# Patient Record
Sex: FEMALE | Race: OTHER
[De-identification: names, ages, dates, MRNs, and addresses within clinical notes are randomized per-mention and may not be internally consistent; named-entity substitution may affect disease eponyms.]

---

## 2018-12-09 ENCOUNTER — HOSPITAL ENCOUNTER (INPATIENT)
Dept: HOSPITAL 72 - EMR | Age: 78
LOS: 2 days | Discharge: NURSING, REHAB OR LONG-TERM CARE | DRG: 395 | End: 2018-12-11
Payer: MEDICARE

## 2018-12-09 VITALS — SYSTOLIC BLOOD PRESSURE: 129 MMHG | DIASTOLIC BLOOD PRESSURE: 80 MMHG

## 2018-12-09 VITALS — WEIGHT: 130 LBS | HEIGHT: 63 IN | BODY MASS INDEX: 23.04 KG/M2

## 2018-12-09 VITALS — SYSTOLIC BLOOD PRESSURE: 124 MMHG | DIASTOLIC BLOOD PRESSURE: 77 MMHG

## 2018-12-09 DIAGNOSIS — R13.10: ICD-10-CM

## 2018-12-09 DIAGNOSIS — Z88.0: ICD-10-CM

## 2018-12-09 DIAGNOSIS — Z91.040: ICD-10-CM

## 2018-12-09 DIAGNOSIS — K94.23: Primary | ICD-10-CM
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
## 2018-12-09 DIAGNOSIS — F09: ICD-10-CM

## 2018-12-09 DIAGNOSIS — I69.298: ICD-10-CM

## 2018-12-09 DIAGNOSIS — G40.909: ICD-10-CM

## 2018-12-09 DIAGNOSIS — E86.0: ICD-10-CM

## 2018-12-09 LAB
ADD MANUAL DIFF: NO
ALBUMIN SERPL-MCNC: 3.6 G/DL (ref 3.4–5)
ALBUMIN/GLOB SERPL: 0.7 {RATIO} (ref 1–2.7)
ALP SERPL-CCNC: 86 U/L (ref 46–116)
ALT SERPL-CCNC: 24 U/L (ref 12–78)
ANION GAP SERPL CALC-SCNC: 7 MMOL/L (ref 5–15)
APPEARANCE UR: CLEAR
APTT BLD: 25 SEC (ref 23–33)
APTT PPP: (no result) S
AST SERPL-CCNC: 21 U/L (ref 15–37)
BASOPHILS NFR BLD AUTO: 2.3 % (ref 0–2)
BILIRUB SERPL-MCNC: 0.2 MG/DL (ref 0.2–1)
BUN SERPL-MCNC: 18 MG/DL (ref 7–18)
CALCIUM SERPL-MCNC: 9.1 MG/DL (ref 8.5–10.1)
CHLORIDE SERPL-SCNC: 105 MMOL/L (ref 98–107)
CO2 SERPL-SCNC: 27 MMOL/L (ref 21–32)
CREAT SERPL-MCNC: 0.5 MG/DL (ref 0.55–1.3)
EOSINOPHIL NFR BLD AUTO: 5.7 % (ref 0–3)
ERYTHROCYTE [DISTWIDTH] IN BLOOD BY AUTOMATED COUNT: 12.4 % (ref 11.6–14.8)
GLOBULIN SER-MCNC: 4.9 G/DL
GLUCOSE UR STRIP-MCNC: NEGATIVE MG/DL
HCT VFR BLD CALC: 38.6 % (ref 37–47)
HGB BLD-MCNC: 12.8 G/DL (ref 12–16)
INR PPP: 1 (ref 0.9–1.1)
KETONES UR QL STRIP: NEGATIVE
LEUKOCYTE ESTERASE UR QL STRIP: NEGATIVE
LYMPHOCYTES NFR BLD AUTO: 19.4 % (ref 20–45)
MCV RBC AUTO: 92 FL (ref 80–99)
MONOCYTES NFR BLD AUTO: 11.9 % (ref 1–10)
NEUTROPHILS NFR BLD AUTO: 60.7 % (ref 45–75)
NITRITE UR QL STRIP: POSITIVE
PH UR STRIP: 8 [PH] (ref 4.5–8)
PLATELET # BLD: 285 K/UL (ref 150–450)
POTASSIUM SERPL-SCNC: 4.6 MMOL/L (ref 3.5–5.1)
PROT UR QL STRIP: NEGATIVE
RBC # BLD AUTO: 4.2 M/UL (ref 4.2–5.4)
SODIUM SERPL-SCNC: 139 MMOL/L (ref 136–145)
SP GR UR STRIP: 1.01 (ref 1–1.03)
UROBILINOGEN UR-MCNC: NORMAL MG/DL (ref 0–1)
WBC # BLD AUTO: 6.6 K/UL (ref 4.8–10.8)

## 2018-12-09 PROCEDURE — 80048 BASIC METABOLIC PNL TOTAL CA: CPT

## 2018-12-09 PROCEDURE — 86850 RBC ANTIBODY SCREEN: CPT

## 2018-12-09 PROCEDURE — 96360 HYDRATION IV INFUSION INIT: CPT

## 2018-12-09 PROCEDURE — 99285 EMERGENCY DEPT VISIT HI MDM: CPT

## 2018-12-09 PROCEDURE — 85610 PROTHROMBIN TIME: CPT

## 2018-12-09 PROCEDURE — 86901 BLOOD TYPING SEROLOGIC RH(D): CPT

## 2018-12-09 PROCEDURE — 93005 ELECTROCARDIOGRAM TRACING: CPT

## 2018-12-09 PROCEDURE — 80053 COMPREHEN METABOLIC PANEL: CPT

## 2018-12-09 PROCEDURE — 87081 CULTURE SCREEN ONLY: CPT

## 2018-12-09 PROCEDURE — 80299 QUANTITATIVE ASSAY DRUG: CPT

## 2018-12-09 PROCEDURE — 81003 URINALYSIS AUTO W/O SCOPE: CPT

## 2018-12-09 PROCEDURE — 84484 ASSAY OF TROPONIN QUANT: CPT

## 2018-12-09 PROCEDURE — 96361 HYDRATE IV INFUSION ADD-ON: CPT

## 2018-12-09 PROCEDURE — 36415 COLL VENOUS BLD VENIPUNCTURE: CPT

## 2018-12-09 PROCEDURE — 71045 X-RAY EXAM CHEST 1 VIEW: CPT

## 2018-12-09 PROCEDURE — 85025 COMPLETE CBC W/AUTO DIFF WBC: CPT

## 2018-12-09 PROCEDURE — 85730 THROMBOPLASTIN TIME PARTIAL: CPT

## 2018-12-09 PROCEDURE — 74018 RADEX ABDOMEN 1 VIEW: CPT

## 2018-12-09 PROCEDURE — 83690 ASSAY OF LIPASE: CPT

## 2018-12-09 PROCEDURE — 86900 BLOOD TYPING SEROLOGIC ABO: CPT

## 2018-12-09 NOTE — EMERGENCY ROOM REPORT
History of Present Illness


General


Chief Complaint:  Malfunctioning Gastric Tube


Source:  Patient, EMS, PMD





Present Illness


HPI


Patient comes in with allegedly leaking gastrostomy tube.  She states she wants 

this removed as she's taking in adequate calories by mouth.





She denies any chest pain nausea vomiting diarrhea dysuria rashes weakness 

dizziness depression.





According to Dr. Murphy she is confused and not an accurate historian.  He 

states that she suffers from dysphagia and is unable to swallow.  She needs to 

have L mentation by the gastrostomy tube or else she gets dehydrated.  Also she 

cannot swallow pills.


Allergies:  


Coded Allergies:  


     LATEX (Verified  Allergy, Unknown, 12/9/18)


     PENICILLINS (Verified  Allergy, Unknown, 12/9/18)





Patient History


Past Medical History:  see triage record


Social History:  Denies: smoking, alcohol use, drug use


Social History Narrative


SNF


Last Menstrual Period:  n/a


Reviewed Nursing Documentation:  PMH: Agreed; PSxH: Agreed





Nursing Documentation-PMH


Hx Gastrointestinal Problems:  Yes - dysphagia





Physical Exam





Vital Signs








  Date Time  Temp Pulse Resp B/P (MAP) Pulse Ox O2 Delivery O2 Flow Rate FiO2


 


12/9/18 20:16 98.1 77 20 123/85 93   


 


12/9/18 20:27      Room Air  








Sp02 EP Interpretation:  reviewed, normal


General Appearance:  no apparent distress, alert, GCS 15, Chronically Ill


Head:  normocephalic, atraumatic


Eyes:  bilateral eye normal inspection, bilateral eye PERRL


ENT:  hearing grossly normal, normal voice, moist mucus membranes


Neck:  full range of motion, supple


Respiratory:  no respiratory distress, speaking full sentences


Gastrointestinal:  soft, other - gastrostomy tube with tape


Genitourinary:  no CVA tenderness


Neurologic:  alert, oriented - X2


Psychiatric:  mood/affect normal


Skin:  no rash





Medical Decision Making


Diagnostic Impression:  


 Primary Impression:  


 Malfunction of percutaneous endoscopic gastrostomy (PEG) tube


 Additional Impressions:  


 Dehydration


 Dysphagia


 Qualified Codes:  R13.10 - Dysphagia, unspecified


ER Course


Patient with a leaking gastrostomy tube.  Initially we were considering 

changing this out.  In assessing the tubes it appears to be a  PEG tube instead 

of a gastrostomy tube that can't be replaced by deflating balloon.  Will need 

GI to perform.  





The patient clinically is stable.  This needs to be performed by his 

gastroenterologist and via endoscopy.  In addition the patient is requesting 

that the tube be removed completely and states that she feels it's not 

necessary at this time.





According to PMD, patient with confabulation and dysphagia - unable to swallow 

on own - anything.





After discussion with her doctor plan is to admit the patient to the hospital 

to have this PEG tube replaced.  In the interim she needs to have IV hydration.

  Evaluation will be with EKG, chest x-ray, abdominal film and labs.





EKG without injury.  Labs with normal CBC.  CMP - elevated BUN compared to 

creat.





Admit med Dr. Cheema.





Laboratory Tests








Test


  12/9/18


22:00


 


White Blood Count


  6.6 K/UL


(4.8-10.8)


 


Red Blood Count


  4.20 M/UL


(4.20-5.40)


 


Hemoglobin


  12.8 G/DL


(12.0-16.0)


 


Hematocrit


  38.6 %


(37.0-47.0)


 


Mean Corpuscular Volume 92 FL (80-99)  


 


Mean Corpuscular Hemoglobin


  30.5 PG


(27.0-31.0)


 


Mean Corpuscular Hemoglobin


Concent 33.2 G/DL


(32.0-36.0)


 


Red Cell Distribution Width


  12.4 %


(11.6-14.8)


 


Platelet Count


  285 K/UL


(150-450)


 


Mean Platelet Volume


  7.0 FL


(6.5-10.1)


 


Neutrophils (%) (Auto)


  60.7 %


(45.0-75.0)


 


Lymphocytes (%) (Auto)


  19.4 %


(20.0-45.0)  L


 


Monocytes (%) (Auto)


  11.9 %


(1.0-10.0)  H


 


Eosinophils (%) (Auto)


  5.7 %


(0.0-3.0)  H


 


Basophils (%) (Auto)


  2.3 %


(0.0-2.0)  H


 


Prothrombin Time


  10.2 SEC


(9.30-11.50)


 


Prothrombin Time INR 1.0 (0.9-1.1)  


 


PTT


  25 SEC (23-33)


 


 


Urine Color Pale yellow  


 


Urine Appearance Clear  


 


Urine pH 8 (4.5-8.0)  


 


Urine Specific Gravity


  1.010


(1.005-1.035)


 


Urine Protein


  Negative


(NEGATIVE)


 


Urine Glucose (UA)


  Negative


(NEGATIVE)


 


Urine Ketones


  Negative


(NEGATIVE)


 


Urine Blood


  Negative


(NEGATIVE)


 


Urine Nitrite


  Positive


(NEGATIVE)  H


 


Urine Bilirubin


  Negative


(NEGATIVE)


 


Urine Urobilinogen


  Normal MG/DL


(0.0-1.0)


 


Urine Leukocyte Esterase


  Negative


(NEGATIVE)


 


Urine RBC


  0 /HPF (0 - 2)


 


 


Urine WBC


  0-2 /HPF (0 -


2)


 


Urine Squamous Epithelial


Cells Few /LPF


(NONE/OCC)


 


Urine Bacteria


  Occasional


/HPF (NONE)


 


Sodium Level


  139 MMOL/L


(136-145)


 


Potassium Level


  4.6 MMOL/L


(3.5-5.1)


 


Chloride Level


  105 MMOL/L


()


 


Carbon Dioxide Level


  27 MMOL/L


(21-32)


 


Anion Gap


  7 mmol/L


(5-15)


 


Blood Urea Nitrogen


  18 mg/dL


(7-18)


 


Creatinine


  0.5 MG/DL


(0.55-1.30)  L


 


Estimate Glomerular


Filtration Rate  mL/min (>60)  


 


 


Glucose Level


  92 MG/DL


()


 


Calcium Level


  9.1 MG/DL


(8.5-10.1)


 


Total Bilirubin


  0.2 MG/DL


(0.2-1.0)


 


Aspartate Amino Transferase


(AST) 21 U/L (15-37)


 


 


Alanine Aminotransferase (ALT)


  24 U/L (12-78)


 


 


Alkaline Phosphatase


  86 U/L


()


 


Troponin I


  0.000 ng/mL


(0.000-0.056)


 


Total Protein


  8.5 G/DL


(6.4-8.2)  H


 


Albumin


  3.6 G/DL


(3.4-5.0)


 


Globulin 4.9 g/dL  


 


Albumin/Globulin Ratio


  0.7 (1.0-2.7)


L


 


Lipase


  94 U/L


()








EKG Diagnostic Results


Rate:  normal


Rhythm:  NSR


ST Segments:  no acute changes





Rhythm Strip Diag. Results


EP Interpretation:  yes


Rhythm:  NSR, no PVC's, no ectopy





Chest X-Ray Diagnostic Results


Chest X-Ray Diagnostic Results :  


   Chest X-Ray Ordered:  Yes


   # of Views/Limited/Complete:  1 View


   Indication:  Other


   EP Interpretation:  Yes


   Interpretation:  no consolidation, no effusion, no pneumothorax


   Impression:  No acute disease


   Electronically Signed by:  Bob Dennis MD





Other X-Ray Diagnostic Results


Other X-Ray Diagnostic Results :  


   X-Ray ordered:  abd


   # of Views/Limited Vs Complete:  1 View


   Indication:  Other


   EP Interpretation:  Yes


   Interpretation:  nonspecific bowel gas, no sbo, other - G tube


   Impression:  Other


   Electronically Signed by:  Bob Dennis MD





Last Vital Signs








  Date Time  Temp Pulse Resp B/P (MAP) Pulse Ox O2 Delivery O2 Flow Rate FiO2


 


12/10/18 00:00 97.3 73 18 130/79 (96) 94   


 


12/9/18 23:59      Room Air  








Status:  improved


Disposition:  ADMITTED AS INPATIENT


Condition:  Serious











Bob Dennis MD Dec 9, 2018 21:19

## 2018-12-10 VITALS — DIASTOLIC BLOOD PRESSURE: 79 MMHG | SYSTOLIC BLOOD PRESSURE: 130 MMHG

## 2018-12-10 VITALS — SYSTOLIC BLOOD PRESSURE: 114 MMHG | DIASTOLIC BLOOD PRESSURE: 72 MMHG

## 2018-12-10 VITALS — SYSTOLIC BLOOD PRESSURE: 121 MMHG | DIASTOLIC BLOOD PRESSURE: 88 MMHG

## 2018-12-10 VITALS — SYSTOLIC BLOOD PRESSURE: 136 MMHG | DIASTOLIC BLOOD PRESSURE: 79 MMHG

## 2018-12-10 VITALS — SYSTOLIC BLOOD PRESSURE: 119 MMHG | DIASTOLIC BLOOD PRESSURE: 80 MMHG

## 2018-12-10 VITALS — SYSTOLIC BLOOD PRESSURE: 143 MMHG | DIASTOLIC BLOOD PRESSURE: 88 MMHG

## 2018-12-10 LAB
ADD MANUAL DIFF: NO
ANION GAP SERPL CALC-SCNC: 6 MMOL/L (ref 5–15)
BASOPHILS NFR BLD AUTO: 1.8 % (ref 0–2)
BUN SERPL-MCNC: 12 MG/DL (ref 7–18)
CALCIUM SERPL-MCNC: 8.7 MG/DL (ref 8.5–10.1)
CHLORIDE SERPL-SCNC: 107 MMOL/L (ref 98–107)
CO2 SERPL-SCNC: 26 MMOL/L (ref 21–32)
CREAT SERPL-MCNC: 0.4 MG/DL (ref 0.55–1.3)
EOSINOPHIL NFR BLD AUTO: 5.8 % (ref 0–3)
ERYTHROCYTE [DISTWIDTH] IN BLOOD BY AUTOMATED COUNT: 12.1 % (ref 11.6–14.8)
HCT VFR BLD CALC: 36.2 % (ref 37–47)
HGB BLD-MCNC: 12.3 G/DL (ref 12–16)
LYMPHOCYTES NFR BLD AUTO: 16.8 % (ref 20–45)
MCV RBC AUTO: 91 FL (ref 80–99)
MONOCYTES NFR BLD AUTO: 9.7 % (ref 1–10)
NEUTROPHILS NFR BLD AUTO: 65.9 % (ref 45–75)
PLATELET # BLD: 277 K/UL (ref 150–450)
POTASSIUM SERPL-SCNC: 4.1 MMOL/L (ref 3.5–5.1)
RBC # BLD AUTO: 3.97 M/UL (ref 4.2–5.4)
SODIUM SERPL-SCNC: 139 MMOL/L (ref 136–145)
WBC # BLD AUTO: 5.8 K/UL (ref 4.8–10.8)

## 2018-12-10 PROCEDURE — 0D20XUZ CHANGE FEEDING DEVICE IN UPPER INTESTINAL TRACT, EXTERNAL APPROACH: ICD-10-PCS

## 2018-12-10 RX ADMIN — HEPARIN SODIUM SCH UNITS: 5000 INJECTION INTRAVENOUS; SUBCUTANEOUS at 09:00

## 2018-12-10 RX ADMIN — HEPARIN SODIUM SCH UNITS: 5000 INJECTION INTRAVENOUS; SUBCUTANEOUS at 21:02

## 2018-12-10 NOTE — GI INITIAL CONSULT NOTE
History of Present Illness


General


Date patient seen:  Dec 10, 2018


Time patient seen:  12:06


Reason for Hospitalization:  Malfunctioning Gastric Tube


Referring physician:  LAUREL MORA


Reason for Consultation:  GT MALFUNCTION





Present Illness


HPI


Patient comes in with allegedly leaking gastrostomy tube.  She states she wants 

this removed as she's taking in adequate calories by mouth.


She denies any chest pain nausea vomiting diarrhea dysuria rashes weakness 

dizziness depression.


According to Dr. Mora she is confused and not an accurate historian.  He 

states that she suffers from dysphagia and is unable to swallow.  She needs to 

have L mentation by the gastrostomy tube or else she gets dehydrated.  Also she 

cannot swallow pills.


GI consulted for GT malfunction.  Pt seen, awake A&Ox3, noted as a poor 

historian presents today with c/o of GT malfunction.  No active s/sx of N/V/D.  

The patient states she had the tube in for approximately 2 years.  Denies any 

abdominal pain, N/V/D or constipation.  States she is on a regular diet back 

home.  The GT itself is in poor condition and wrapped with tape to prevent 

leakage.  Unknown history of endoscopy / colonoscopy.


Home Meds


Reported Medications


Levetiracetam (Keppra) 100 Mg/1 Ml Solution, 5 ML ORAL TWICE A DAY, #300 ML 0 

Refills


   12/9/18


Bisacodyl (DULCOLAX) 10 Mg Supp.rect, 10 MG RC, SUPP


   12/9/18


Magnesium Hydroxide* (MILK OF MAGNESIA*) 400 Mg/5 Ml Oral.susp, 30 ML ORAL DAILY

, ML


   12/9/18


Multivitamin (Multivitamins) 1 Each Tablet, 1 EACH PO, TAB


   12/9/18


Potassium Chloride (Potassium Chloride) 20 Meq/15 Ml Liquid, 20 MEQ GT, ML


   12/9/18


Famotidine (FAMOTIDINE) 20 Mg Tablet, 20 MG ORAL DAILY, #30 TAB 0 Refills


   12/9/18


Med list reviewed/reconciled:  Yes


Allergies:  


Coded Allergies:  


     LATEX (Verified  Allergy, Unknown, 12/9/18)


     PENICILLINS (Verified  Allergy, Unknown, 12/9/18)





Patient History


Limited by:  medical condition


History Provided By:  Medical Record


PMH Narrative


Past Medical History:  see triage record


Social History:  Denies: smoking, alcohol use, drug use


Social History Narrative


SNF


Last Menstrual Period:  n/a


Reviewed Nursing Documentation:  PMH: Agreed; PSxH: Agreed





Nursing Documentation-PMH


Hx Gastrointestinal Problems:  Yes - dysphagia


Social History:  Denies: smoking, alcohol use, drug use, other





Review of Systems


All Other Systems:  negative except mentioned in HPI





Physical Exam





Vital Signs








  Date Time  Temp Pulse Resp B/P (MAP) Pulse Ox O2 Delivery O2 Flow Rate FiO2


 


12/9/18 20:16 98.1 77 20 123/85 93   


 


12/9/18 20:27      Room Air  








Sp02 EP Interpretation:  reviewed, normal


Labs





Laboratory Tests








Test


  12/9/18


22:00 12/10/18


06:00


 


White Blood Count


  6.6 K/UL


(4.8-10.8) 5.8 K/UL


(4.8-10.8)


 


Red Blood Count


  4.20 M/UL


(4.20-5.40) 3.97 M/UL


(4.20-5.40)  L


 


Hemoglobin


  12.8 G/DL


(12.0-16.0) 12.3 G/DL


(12.0-16.0)


 


Hematocrit


  38.6 %


(37.0-47.0) 36.2 %


(37.0-47.0)  L


 


Mean Corpuscular Volume 92 FL (80-99)   91 FL (80-99)  


 


Mean Corpuscular Hemoglobin


  30.5 PG


(27.0-31.0) 31.1 PG


(27.0-31.0)  H


 


Mean Corpuscular Hemoglobin


Concent 33.2 G/DL


(32.0-36.0) 34.1 G/DL


(32.0-36.0)


 


Red Cell Distribution Width


  12.4 %


(11.6-14.8) 12.1 %


(11.6-14.8)


 


Platelet Count


  285 K/UL


(150-450) 277 K/UL


(150-450)


 


Mean Platelet Volume


  7.0 FL


(6.5-10.1) 6.4 FL


(6.5-10.1)  L


 


Neutrophils (%) (Auto)


  60.7 %


(45.0-75.0) 65.9 %


(45.0-75.0)


 


Lymphocytes (%) (Auto)


  19.4 %


(20.0-45.0)  L 16.8 %


(20.0-45.0)  L


 


Monocytes (%) (Auto)


  11.9 %


(1.0-10.0)  H 9.7 %


(1.0-10.0)


 


Eosinophils (%) (Auto)


  5.7 %


(0.0-3.0)  H 5.8 %


(0.0-3.0)  H


 


Basophils (%) (Auto)


  2.3 %


(0.0-2.0)  H 1.8 %


(0.0-2.0)


 


Prothrombin Time


  10.2 SEC


(9.30-11.50) 


 


 


Prothromb Time International


Ratio 1.0 (0.9-1.1)  


  


 


 


Activated Partial


Thromboplast Time 25 SEC (23-33)


  


 


 


Urine Color Pale yellow   


 


Urine Appearance Clear   


 


Urine pH 8 (4.5-8.0)   


 


Urine Specific Gravity


  1.010


(1.005-1.035) 


 


 


Urine Protein


  Negative


(NEGATIVE) 


 


 


Urine Glucose (UA)


  Negative


(NEGATIVE) 


 


 


Urine Ketones


  Negative


(NEGATIVE) 


 


 


Urine Blood


  Negative


(NEGATIVE) 


 


 


Urine Nitrite


  Positive


(NEGATIVE)  H 


 


 


Urine Bilirubin


  Negative


(NEGATIVE) 


 


 


Urine Urobilinogen


  Normal MG/DL


(0.0-1.0) 


 


 


Urine Leukocyte Esterase


  Negative


(NEGATIVE) 


 


 


Urine RBC


  0 /HPF (0 - 2)


  


 


 


Urine WBC


  0-2 /HPF (0 -


2) 


 


 


Urine Squamous Epithelial


Cells Few /LPF


(NONE/OCC) 


 


 


Urine Bacteria


  Occasional


/HPF (NONE) 


 


 


Sodium Level


  139 MMOL/L


(136-145) 139 MMOL/L


(136-145)


 


Potassium Level


  4.6 MMOL/L


(3.5-5.1) 4.1 MMOL/L


(3.5-5.1)


 


Chloride Level


  105 MMOL/L


() 107 MMOL/L


()


 


Carbon Dioxide Level


  27 MMOL/L


(21-32) 26 MMOL/L


(21-32)


 


Anion Gap


  7 mmol/L


(5-15) 6 mmol/L


(5-15)


 


Blood Urea Nitrogen


  18 mg/dL


(7-18) 12 mg/dL


(7-18)


 


Creatinine


  0.5 MG/DL


(0.55-1.30)  L 0.4 MG/DL


(0.55-1.30)  L


 


Estimat Glomerular Filtration


Rate  mL/min (>60)  


   mL/min (>60)  


 


 


Glucose Level


  92 MG/DL


() 94 MG/DL


()


 


Calcium Level


  9.1 MG/DL


(8.5-10.1) 8.7 MG/DL


(8.5-10.1)


 


Total Bilirubin


  0.2 MG/DL


(0.2-1.0) 


 


 


Aspartate Amino Transf


(AST/SGOT) 21 U/L (15-37)


  


 


 


Alanine Aminotransferase


(ALT/SGPT) 24 U/L (12-78)


  


 


 


Alkaline Phosphatase


  86 U/L


() 


 


 


Troponin I


  0.000 ng/mL


(0.000-0.056) 


 


 


Total Protein


  8.5 G/DL


(6.4-8.2)  H 


 


 


Albumin


  3.6 G/DL


(3.4-5.0) 


 


 


Globulin 4.9 g/dL   


 


Albumin/Globulin Ratio


  0.7 (1.0-2.7)


L 


 


 


Lipase


  94 U/L


() 


 








General Appearance:  well appearing, no apparent distress, alert, thin


Head:  normocephalic


EENT:  PERRL/EOMI, normal ENT inspection


Neck:  supple


Respiratory:  normal breath sounds, no respiratory distress


Cardiovascular:  normal rate


Gastrointestinal:  normal inspection, non tender, soft, normal bowel sounds, non

-distended


Rectal:  deferred


Genitourinary:  no CVA tenderness


Musculoskeletal:  normal inspection, back normal


Neurologic:  normal inspection, alert, oriented x3, responsive


Psychiatric:  normal inspection, judgement/insight normal, memory normal


Skin:  normal inspection, normal color, no rash, warm/dry, palpation normal, 

well hydrated


Lymphatic:  normal inspection, no adenopathy


Current Medications





Current Medications








 Medications


  (Trade)  Dose


 Ordered  Sig/Kristi


 Route


 PRN Reason  Start Time


 Stop Time Status Last Admin


Dose Admin


 


 Dextrose/


 Electrolytes  1,000 ml @ 


 50 mls/hr  Q20H


 IV


   12/10/18 00:15


 1/9/19 00:14  12/10/18 00:53


 


 


 Heparin Sodium


  (Porcine)


  (Heparin 5000


 units/ml)  5,000 units  EVERY 12  HOURS


 SUBQ


   12/10/18 09:00


 1/9/19 08:59   


 


 


 Levetiracetam  100 ml @ 


 400 mls/hr  Q12HR


 IVPB


   12/10/18 09:00


 1/9/19 08:59  12/10/18 10:25


 











GI: Plan


Problems:  


(1) Dysphagia


(2) Malfunction of percutaneous endoscopic gastrostomy (PEG) tube


(3) Dehydration


Plan


GT 20 [place] changed at bedside, CXR for confirmation.


ST evaluation ordered for oral gratification


calorie count x48 hours when given diet


GT site care daily/prn





Discussed with Dr. Rodriguez.


Thank you for this patient referral, we will follow.





The patient was seen and examined at bedside and all new and available data was 

reviewed in the patients chart. I agree with the above findings, impression 

and plan.  (Patient seen earlier today. Signature stamp does not reflect 

patient encounter time.). - MD Melanie ShethBanner Rehabilitation Hospital WestAkash NP Dec 10, 2018 10:54

## 2018-12-10 NOTE — DIAGNOSTIC IMAGING REPORT
Indication: Chest pain

 

Comparison:  None

 

A single view chest radiograph was obtained.

 

Findings:

 

No definite infiltrate or pulmonary vascular congestion identified.  The heart is

enlarged. The aorta is mildly enlarged consistent with atherosclerotic vascular

disease.  The bones are osteopenic.

 

Impression:

 

No acute disease

## 2018-12-10 NOTE — HISTORY AND PHYSICAL REPORT
DATE OF ADMISSION:  12/09/2018

CHIEF COMPLAINT:  Leaking G-tube.



HISTORY OF PRESENT ILLNESS:  This is a 78-year-old female from [facility].  The patient has a gastrostomy tube for the last

several years.  She had recently swallow evaluation done to check the

ability of the patient to sustain herself on a diet.  The patient failed

swallow evaluation.  The G-tube has been leaking for the last week or so.

The patient is admitted for replacement of her G-tube.



PAST MEDICAL HISTORY:

1. Organic brain syndrome.

2. History of subdural hematoma.

3. Organic brain syndrome.

4. Dysphagia.

5. Seizure disorder.



CURRENT MEDICATIONS:

1. Dulcolax.

2. Famotidine.

3. Keppra.

4. Milk of magnesia.

5. Multivitamin.

6. Potassium chloride.



ALLERGIES:  Latex and penicillins.



SOCIAL HISTORY:  The patient lives in a nursing home.



REVIEW OF SYSTEMS:  Unable to obtain due to her mental status.



PHYSICAL EXAMINATION:

GENERAL:  This is an elderly oriental female, who is in no acute

distress.

VITAL SIGNS:  Blood pressure 114/72, pulse 83 and regular, respirations 19,

and temperature 97.2.

HEENT:  The head is normocephalic and atraumatic.  Pupils are equal, round,

and reactive to light and accommodation consensually.

NECK:  Supple.  Trachea midline.  There was no lymphadenopathy or

thyromegaly.

LUNGS:  Clear to auscultation and percussion.

HEART:  Regular rate and rhythm without rubs, murmurs, or gallops.

ABDOMEN:  Soft and nontender.  Bowel sounds were active.

EXTREMITIES:  No clubbing, cyanosis, or edema.

NEUROLOGIC:  She is alert, but confused.  There were no gross focal

findings.



LABORATORY AND ANCILLARY DATA:  CBC within normal limits.  Chemistry within

normal limits.  Albumin level 3.6.



ASSESSMENT:

1. Organic brain syndrome.

2. History of subdural hematoma.

3. Organic brain syndrome.

4. Dysphagia.

5. Seizure disorder.



PLAN:

1. Dr. Rodriguez for G-tube replacement.

2. Discharge back to nursing home following the successful replacement

and in the meantime, keep the patient on IV fluids.









  ______________________________________________

  Shauna Cheema M.D.





DR:  RYAN

D:  12/10/2018 08:17

T:  12/10/2018 17:41

JOB#:  [id number]

CC:

## 2018-12-10 NOTE — DIAGNOSTIC IMAGING REPORT
Indication: Abdominal pain

 

Comparison:  None

 

Single view of the abdomen obtained 

 

Findings:

   

Bowel gas pattern is nonspecific. No mass, ectopic calcifications, or abnormal gas

collections are identified. The bones are unremarkable. Catheter tubing projected

over the mid abdomen appears to be a gastrostomy.

 

Impression: No acute findings

## 2018-12-10 NOTE — DIAGNOSTIC IMAGING REPORT
Indication: Gastrostomy check

 

Comparison:  None

 

Single view of the abdomen obtained 

 

Findings:

   

Contrast demonstrated in the stomach and first portion of the duodenum. Gastrostomy

balloon situated in the lower midline abdomen projected over the antrum.

 

IMPRESSION:

 

Gastrostomy tip in the antrum of the stomach. No leak

## 2018-12-11 VITALS — SYSTOLIC BLOOD PRESSURE: 127 MMHG | DIASTOLIC BLOOD PRESSURE: 83 MMHG

## 2018-12-11 VITALS — SYSTOLIC BLOOD PRESSURE: 112 MMHG | DIASTOLIC BLOOD PRESSURE: 77 MMHG

## 2018-12-11 VITALS — DIASTOLIC BLOOD PRESSURE: 71 MMHG | SYSTOLIC BLOOD PRESSURE: 157 MMHG

## 2018-12-11 VITALS — SYSTOLIC BLOOD PRESSURE: 115 MMHG | DIASTOLIC BLOOD PRESSURE: 59 MMHG

## 2018-12-11 LAB
ADD MANUAL DIFF: NO
ANION GAP SERPL CALC-SCNC: 15 MMOL/L (ref 5–15)
BASOPHILS NFR BLD AUTO: 2 % (ref 0–2)
BUN SERPL-MCNC: 21 MG/DL (ref 7–18)
CALCIUM SERPL-MCNC: 9.1 MG/DL (ref 8.5–10.1)
CHLORIDE SERPL-SCNC: 101 MMOL/L (ref 98–107)
CO2 SERPL-SCNC: 22 MMOL/L (ref 21–32)
CREAT SERPL-MCNC: 0.2 MG/DL (ref 0.55–1.3)
EOSINOPHIL NFR BLD AUTO: 1.1 % (ref 0–3)
ERYTHROCYTE [DISTWIDTH] IN BLOOD BY AUTOMATED COUNT: 12 % (ref 11.6–14.8)
HCT VFR BLD CALC: 46 % (ref 37–47)
HGB BLD-MCNC: 15.5 G/DL (ref 12–16)
LYMPHOCYTES NFR BLD AUTO: 10.3 % (ref 20–45)
MCV RBC AUTO: 92 FL (ref 80–99)
MONOCYTES NFR BLD AUTO: 10.4 % (ref 1–10)
NEUTROPHILS NFR BLD AUTO: 76.2 % (ref 45–75)
PLATELET # BLD: 277 K/UL (ref 150–450)
POTASSIUM SERPL-SCNC: 4.5 MMOL/L (ref 3.5–5.1)
RBC # BLD AUTO: 4.97 M/UL (ref 4.2–5.4)
SODIUM SERPL-SCNC: 137 MMOL/L (ref 136–145)
WBC # BLD AUTO: 6.2 K/UL (ref 4.8–10.8)

## 2018-12-11 RX ADMIN — HEPARIN SODIUM SCH UNITS: 5000 INJECTION INTRAVENOUS; SUBCUTANEOUS at 08:48

## 2018-12-11 NOTE — GENERAL PROGRESS NOTE
Assessment/Plan


Assessment/Plan


Needs PEG replacement. Awaiting Dr. Rodriguez to do today. If not done DC to SNF.





Subjective


Allergies:  


Coded Allergies:  


     LATEX (Verified  Allergy, Unknown, 12/9/18)


     PENICILLINS (Verified  Allergy, Unknown, 12/9/18)


Subjective


Confused





Objective





Last 24 Hour Vital Signs








  Date Time  Temp Pulse Resp B/P (MAP) Pulse Ox O2 Delivery O2 Flow Rate FiO2


 


12/11/18 04:00 97.2 86 18 127/83 (98) 93   


 


12/11/18 00:00 97.8 75 18 157/71 (99) 93   


 


12/10/18 20:34      Room Air  


 


12/10/18 20:00 97.9 76 18 143/88 (106) 96   


 


12/10/18 16:00 98.1 77 16 121/88 (99) 93   


 


12/10/18 12:00 98.1 73 16 136/79 (98) 95   


 


12/10/18 09:00      Room Air  

















Intake and Output  


 


 12/10/18 12/11/18





 19:00 07:00


 


Intake Total 540 ml 360 ml


 


Balance 540 ml 360 ml


 


  


 


Intake Oral 240 ml 360 ml


 


IV Total 300 ml 


 


# Voids 3 2








Height (Feet):  5


Height (Inches):  3.00


Weight (Pounds):  130


Objective


CV RRLungs CTA


Abd SNT. BS +


E No CCe











Shauna Cheema MD Dec 11, 2018 08:04

## 2018-12-11 NOTE — GENERAL PROGRESS NOTE
Assessment/Plan


Assessment/Plan


GT replaced. ANDRE Rodriguez..


DC to SNF.





Subjective


Allergies:  


Coded Allergies:  


     LATEX (Verified  Allergy, Unknown, 12/9/18)


     PENICILLINS (Verified  Allergy, Unknown, 12/9/18)


Subjective


Confused





Objective





Last 24 Hour Vital Signs








  Date Time  Temp Pulse Resp B/P (MAP) Pulse Ox O2 Delivery O2 Flow Rate FiO2


 


12/11/18 04:00 97.2 86 18 127/83 (98) 93   


 


12/11/18 00:00 97.8 75 18 157/71 (99) 93   


 


12/10/18 20:34      Room Air  


 


12/10/18 20:00 97.9 76 18 143/88 (106) 96   


 


12/10/18 16:00 98.1 77 16 121/88 (99) 93   


 


12/10/18 12:00 98.1 73 16 136/79 (98) 95   


 


12/10/18 09:00      Room Air  

















Intake and Output  


 


 12/10/18 12/11/18





 19:00 07:00


 


Intake Total 540 ml 360 ml


 


Balance 540 ml 360 ml


 


  


 


Intake Oral 240 ml 360 ml


 


IV Total 300 ml 


 


# Voids 3 2








Height (Feet):  5


Height (Inches):  3.00


Weight (Pounds):  130


Objective


CV RRLungs CTA


Abd SNT. BS +


E No CCe











Shauna Cheema MD Dec 11, 2018 08:07

## 2018-12-11 NOTE — GI PROGRESS NOTE
Assessment/Plan


Problems:  


(1) Malfunction of percutaneous endoscopic gastrostomy (PEG) tube


ICD Codes:  K94.23 - Gastrostomy malfunction


SNOMED:  540846863


(2) Dehydration


ICD Codes:  E86.0 - Dehydration


SNOMED:  21044820, 693454012


(3) Dysphagia


ICD Codes:  R13.10 - Dysphagia, unspecified


SNOMED:  47621909, [id number]


Qualifiers:  


   Qualified Codes:  R13.10 - Dysphagia, unspecified


Status:  stable


Status Narrative


Discussed with Dr. Rodriguez.


Assessment/Plan


GT 20 [place] changed at bedside.


ST evaluation noted


calorie count x48 hours pending


GT site care daily/prn


dc per primary





The patient was seen and examined at bedside and all new and available data was 

reviewed in the patients chart. I agree with the above findings, impression 

and plan.  (Patient seen earlier today. Signature stamp does not reflect 

patient encounter time.). - Gaston Rodriguez MD





Subjective


Gastrointestinal/Abdominal:  Reports: no symptoms





Objective





Last 24 Hour Vital Signs








  Date Time  Temp Pulse Resp B/P (MAP) Pulse Ox O2 Delivery O2 Flow Rate FiO2


 


12/11/18 08:46 97.4 79 16 112/77 (89) 94   


 


12/11/18 04:00 97.2 86 18 127/83 (98) 93   


 


12/11/18 00:00 97.8 75 18 157/71 (99) 93   


 


12/10/18 20:34      Room Air  


 


12/10/18 20:00 97.9 76 18 143/88 (106) 96   


 


12/10/18 16:00 98.1 77 16 121/88 (99) 93   

















Intake and Output  


 


 12/10/18 12/11/18





 19:00 07:00


 


Intake Total 540 ml 360 ml


 


Balance 540 ml 360 ml


 


  


 


Intake Oral 240 ml 360 ml


 


IV Total 300 ml 


 


# Voids 3 2











Laboratory Tests








Test


  12/11/18


08:14


 


White Blood Count


  6.2 K/UL


(4.8-10.8)


 


Red Blood Count


  4.97 M/UL


(4.20-5.40)


 


Hemoglobin


  15.5 G/DL


(12.0-16.0)


 


Hematocrit


  46.0 %


(37.0-47.0)


 


Mean Corpuscular Volume 92 FL (80-99)  


 


Mean Corpuscular Hemoglobin


  31.1 PG


(27.0-31.0)  H


 


Mean Corpuscular Hemoglobin


Concent 33.6 G/DL


(32.0-36.0)


 


Red Cell Distribution Width


  12.0 %


(11.6-14.8)


 


Platelet Count


  277 K/UL


(150-450)


 


Mean Platelet Volume


  6.9 FL


(6.5-10.1)


 


Neutrophils (%) (Auto)


  76.2 %


(45.0-75.0)  H


 


Lymphocytes (%) (Auto)


  10.3 %


(20.0-45.0)  L


 


Monocytes (%) (Auto)


  10.4 %


(1.0-10.0)  H


 


Eosinophils (%) (Auto)


  1.1 %


(0.0-3.0)


 


Basophils (%) (Auto)


  2.0 %


(0.0-2.0)


 


Sodium Level


  137 MMOL/L


(136-145)


 


Potassium Level


  4.5 MMOL/L


(3.5-5.1)


 


Chloride Level


  101 MMOL/L


()


 


Carbon Dioxide Level


  22 MMOL/L


(21-32)


 


Anion Gap


  15 mmol/L


(5-15)


 


Blood Urea Nitrogen


  21 mg/dL


(7-18)  H


 


Creatinine


  0.2 MG/DL


(0.55-1.30)  L


 


Estimat Glomerular Filtration


Rate  mL/min (>60)  


 


 


Glucose Level


  77 MG/DL


()


 


Calcium Level


  9.1 MG/DL


(8.5-10.1)








Height (Feet):  5


Height (Inches):  3.00


Weight (Pounds):  130


General Appearance:  WD/WN, no apparent distress, alert


Cardiovascular:  normal rate


Respiratory/Chest:  normal breath sounds, no respiratory distress


Abdominal Exam:  normal bowel sounds, non tender, soft, GT site - c/d/i


Extremities:  normal range of motion, non-tender











Marco Navarro NP Dec 11, 2018 13:08

## 2018-12-12 NOTE — DISCHARGE SUMMARY
Discharge Summary


Discharge Summary


_


DATE OF ADMISSION: 12/9/2018 





DATE OF DISCHARGE: 12/11/2018





REASON FOR ADMISSION: 


78 years old female with past medical history of organic brain syndrome, 

history of subdural hematoma, dysphagia, seizure disorder,  resident of the  

skilled nursing facility, presented for leaking G-tube.  


Patient failed recent swallow evaluation.  


Patient presented for replacement of G-tube.


Upon evaluation vital signs were stable


Laboratory workup revealed no leukocytosis , stable hemoglobin  and hematocrit.


Stable electrolytes and renal parameters.  


Troponin negative.  


Chest x-ray revealed no acute cardiopulmonary pathology.


Abdominal x-ray revealed no acute findings.


Patient admitted with diagnoses of malfunctioning G-tube/leaking; dehydration, 

dysphagia.





CONSULTANTS:


GI specialist Dr. Rodriguez


 


 


[facility] COURSE: 


Patient admitted to medical surgical floor. and  started on the IV hydration.  


GI consult was requested   for G-tube replacement. 


GI specialist seen and evaluated the patient. 


20 [place] G-tube gastrostomy tube was changed at the bedside.  


X-ray confirmed placement.  


Bedside swallow evaluation was ordered to check for possible oral 

gratification..


Per speech therapist  evaluation , patient had a grossly functional swallowing 

with thin liquids, pured teaspoon and soft masticated solids.  


Speech therapist recommended consider barium swallow study to further assess 

swallowing  and determine silent aspiration risk and attempt trial treatment.  


If oral diet considering for quality of life , start diet as per speech 

therapist recommendation with strict aspiration/  reflux precautions and one-to-

one feeding.


G-tube site care provided daily . 


Patient started  on G tube feeding and advance to goal rate as per dietician 

recommendation with close monitoring of tolerance.  


Patient was able to tolerate tube feeding.  


Dietician  recommended  trail of oral gratification at the  [facility] nursing 

facility  with 1 to 1 supervision and strict aspiration precautions, and 

calorie count to assess  oral intake. 


Seizure precaution maintained.  Keppra continued .  No evidence of seizure 

activity while in the hospital.  


DVT prophylaxis provided.  





FINAL DIAGNOSES: 


Malfunctioning/leaking G-tube


Dysphagia


Status post G-tube replacement


Seizure disorder


Organic brain syndrome


History of subdural hematoma





DISCHARGE MEDICATIONS:


See Medication Reconciliation list.





DISCHARGE INSTRUCTIONS:


Patient was discharged to the skilled nursing facility. 


Follow up with medical doctor at the facility.





I have been assigned to dictate discharge summary for this account. I was not 

involved in the patient's management.











Hilaria Santiago NP Dec 12, 2018 10:00